# Patient Record
Sex: FEMALE | Race: WHITE | NOT HISPANIC OR LATINO
[De-identification: names, ages, dates, MRNs, and addresses within clinical notes are randomized per-mention and may not be internally consistent; named-entity substitution may affect disease eponyms.]

---

## 2021-11-12 ENCOUNTER — RESULT REVIEW (OUTPATIENT)
Age: 76
End: 2021-11-12

## 2022-02-28 ENCOUNTER — RESULT REVIEW (OUTPATIENT)
Age: 77
End: 2022-02-28

## 2022-08-10 ENCOUNTER — APPOINTMENT (OUTPATIENT)
Dept: PHYSICAL MEDICINE AND REHAB | Facility: CLINIC | Age: 77
End: 2022-08-10

## 2022-08-10 VITALS — WEIGHT: 130 LBS | BODY MASS INDEX: 23.04 KG/M2 | HEIGHT: 63 IN

## 2022-08-10 DIAGNOSIS — M47.812 SPONDYLOSIS W/OUT MYELOPATHY OR RADICULOPATHY, CERVICAL REGION: ICD-10-CM

## 2022-08-10 DIAGNOSIS — Z86.79 PERSONAL HISTORY OF OTHER DISEASES OF THE CIRCULATORY SYSTEM: ICD-10-CM

## 2022-08-10 DIAGNOSIS — M62.838 OTHER MUSCLE SPASM: ICD-10-CM

## 2022-08-10 DIAGNOSIS — M54.2 CERVICALGIA: ICD-10-CM

## 2022-08-10 DIAGNOSIS — M43.10 SPONDYLOLISTHESIS, SITE UNSPECIFIED: ICD-10-CM

## 2022-08-10 DIAGNOSIS — M19.90 UNSPECIFIED OSTEOARTHRITIS, UNSPECIFIED SITE: ICD-10-CM

## 2022-08-10 DIAGNOSIS — M50.90 CERVICAL DISC DISORDER, UNSPECIFIED, UNSPECIFIED CERVICAL REGION: ICD-10-CM

## 2022-08-10 DIAGNOSIS — Z86.2 PERSONAL HISTORY OF DISEASES OF THE BLOOD AND BLOOD-FORMING ORGANS AND CERTAIN DISORDERS INVOLVING THE IMMUNE MECHANISM: ICD-10-CM

## 2022-08-10 PROBLEM — Z00.00 ENCOUNTER FOR PREVENTIVE HEALTH EXAMINATION: Status: ACTIVE | Noted: 2022-08-10

## 2022-08-10 PROCEDURE — 99205 OFFICE O/P NEW HI 60 MIN: CPT

## 2022-08-10 RX ORDER — OXYCODONE AND ACETAMINOPHEN TABLETS 10; 300 MG/1; MG/1
TABLET ORAL
Refills: 0 | Status: ACTIVE | COMMUNITY

## 2022-08-10 NOTE — ASSESSMENT
[FreeTextEntry1] : PT 2-3xweekly/x4 weeks - C/S\par MH, ES, US, DTM - C/S\par Trapezii and rhomboid stretch and strengthening \par Scapular mobilization\par Isometrics: C/S\par Upper extremities PRE'S advanced as tolerated. \par Instruction in proper posturing and body mechanics.\par Instruction in HEP.\par \par Recheck in 4 weeks. If symptoms persist, initiate trial of trigger point injections to C/S 1xweekly/x6 weeks.

## 2022-08-10 NOTE — REVIEW OF SYSTEMS
[Negative] : Heme/Lymph [FreeTextEntry3] : see exam  [FreeTextEntry9] : see exam  [de-identified] : see exam

## 2022-08-10 NOTE — PHYSICAL EXAM
[Normal] : Normal skin color and pigmentation, normal turgor and no rash [Normal Female] : normal external genitalia, urethral meatus without lesions or discharge. Bladder non-distended, without tenderness. Vagina and cervix normal on visual inspection. On bimanual exam uterus, adnexa, parametria all normal without any discrete masses. [FreeTextEntry1] : Nephew was present during interview and examination. Pt ambulates with the use of a walker. \par \par EXAMINATION OF THE CERVICAL SPINE AND UPPER EXTREMITIES: \par Upon inspection, old lumbar surgical site noted. 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  Smell and taste were not tested, otherwise grossly intact. \par Visual fields were full.  \par Romberg testing was negative.  \par There was no dysmetria of the upper extremities. \par Reflexes revealed 2 and symmetrical throughout. \par MMT U/E: Unable to assess proximally seconding to C/S pain. Good elbow flexion and extension strength as well as dorsiflexion and palmar flexion at the wrists bilaterally. Pt is unable to make a fist bilaterally secondary to hand deformities.  \par Sensory examination revealed sensation was intact.\par Vibratory and proprioceptive testing were intact.  \par Peripheral pulses were palpable bilaterally.  Isaacs Test was negative bilaterally.  Tinels Test was negative at the wrists bilaterally.  \par The Spurling Cervical Compression Test was negative.  The Adson’s Maneuver was negative bilaterally.  No scapular winging was noted. .  \par \par Range of motion testing was performed with the use of a goniometer.  \par On range of motion testing, \par Flexion was to 25º (normal - 45º)\par Extension was to 10º (normal - 55º)\par Right rotation was to 20º (normal - 70º)\par Left rotation was to 30º (normal - 70º)\par Right lateral bending was to 10º (normal - 40º)\par Left lateral bending was to 15º (normal - 40º)\par Limited ROM bilateral shoulders. Bilateral elbows lack approximately 10 degrees of extension. \par \par On palpation, of the cervical spine there was tenderness and involving the bilateral cervical paraspinal musculature with greater involvement on the right. Marked spasm bilateral trapezii. \par \par \par  [de-identified] : see exam  [de-identified] : see exam  [de-identified] : see exam  [de-identified] : see exam

## 2022-08-10 NOTE — HISTORY OF PRESENT ILLNESS
[FreeTextEntry1] : Pt is a 77 year old female who presents for evaluation for C/S pain. Pt reports in February 2022, she fell while walking to the bathroom. After experiencing the fall, pt reports an onset of neck pain and headaches with intermittent pain and paraesthesia involving her bilateral U/Es with greater involvement on the right. Pt states she followed-up with her Neurosurgeon, Dr Miller, and an MRI of the brain was obtained. More recently, reports evaluation with her PCP, Dr. Espinoza, and an MRI of the C/S was obtained. Oxycodone and Flexeril were reportedly prescribed. Unfortunately, she was unable to tolerate muscle relaxer. Using Oxycodone on a PRN basis. \par Pt was referred to my office today for evaluation and treatment for her C/S complaints.

## 2022-08-10 NOTE — CONSULT LETTER
[Dear  ___] : Dear  [unfilled], [Consult Letter:] : I had the pleasure of evaluating your patient, [unfilled]. [( Thank you for referring [unfilled] for consultation for _____ )] : Thank you for referring [unfilled] for consultation for [unfilled] [Please see my note below.] : Please see my note below. [Consult Closing:] : Thank you very much for allowing me to participate in the care of this patient.  If you have any questions, please do not hesitate to contact me. [Sincerely,] : Sincerely, [FreeTextEntry3] : Og Guaman MD

## 2022-09-14 ENCOUNTER — APPOINTMENT (OUTPATIENT)
Dept: PHYSICAL MEDICINE AND REHAB | Facility: CLINIC | Age: 77
End: 2022-09-14

## 2022-09-28 ENCOUNTER — APPOINTMENT (OUTPATIENT)
Dept: PHYSICAL MEDICINE AND REHAB | Facility: CLINIC | Age: 77
End: 2022-09-28

## 2022-10-05 ENCOUNTER — APPOINTMENT (OUTPATIENT)
Dept: PHYSICAL MEDICINE AND REHAB | Facility: CLINIC | Age: 77
End: 2022-10-05

## 2022-10-12 ENCOUNTER — APPOINTMENT (OUTPATIENT)
Dept: PHYSICAL MEDICINE AND REHAB | Facility: CLINIC | Age: 77
End: 2022-10-12

## 2022-10-19 ENCOUNTER — APPOINTMENT (OUTPATIENT)
Dept: PHYSICAL MEDICINE AND REHAB | Facility: CLINIC | Age: 77
End: 2022-10-19

## 2022-11-01 ENCOUNTER — APPOINTMENT (OUTPATIENT)
Dept: PHYSICAL MEDICINE AND REHAB | Facility: CLINIC | Age: 77
End: 2022-11-01